# Patient Record
Sex: FEMALE | Race: WHITE | NOT HISPANIC OR LATINO | Employment: FULL TIME | URBAN - NONMETROPOLITAN AREA
[De-identification: names, ages, dates, MRNs, and addresses within clinical notes are randomized per-mention and may not be internally consistent; named-entity substitution may affect disease eponyms.]

---

## 2024-05-14 ENCOUNTER — HOSPITAL ENCOUNTER (EMERGENCY)
Facility: HOSPITAL | Age: 44
Discharge: HOME | End: 2024-05-14
Attending: EMERGENCY MEDICINE
Payer: COMMERCIAL

## 2024-05-14 ENCOUNTER — APPOINTMENT (EMERGENCY)
Dept: RADIOLOGY | Facility: HOSPITAL | Age: 44
End: 2024-05-14
Payer: COMMERCIAL

## 2024-05-14 VITALS
DIASTOLIC BLOOD PRESSURE: 77 MMHG | HEART RATE: 76 BPM | SYSTOLIC BLOOD PRESSURE: 126 MMHG | OXYGEN SATURATION: 98 % | RESPIRATION RATE: 18 BRPM | TEMPERATURE: 97.2 F

## 2024-05-14 DIAGNOSIS — S52.502A DISTAL RADIUS FRACTURE, LEFT: Primary | ICD-10-CM

## 2024-05-14 PROCEDURE — 99284 EMERGENCY DEPT VISIT MOD MDM: CPT | Performed by: EMERGENCY MEDICINE

## 2024-05-14 PROCEDURE — 99283 EMERGENCY DEPT VISIT LOW MDM: CPT

## 2024-05-14 PROCEDURE — 29125 APPL SHORT ARM SPLINT STATIC: CPT | Performed by: EMERGENCY MEDICINE

## 2024-05-14 PROCEDURE — 73110 X-RAY EXAM OF WRIST: CPT

## 2024-05-14 NOTE — ED PROVIDER NOTES
"History  Chief Complaint   Patient presents with    Wrist Injury     Pt was picking up dog food and felt a \"pop\" in left wrist.      Patient is a 44-year-old female presenting to the emergency department complaining of pain in her left wrist, states she was picking up a 48 pound bag of dog food when she felt a pop in her wrist and has been having pain since, denies any fall, no other pain or complaints        Prior to Admission Medications   Prescriptions Last Dose Informant Patient Reported? Taking?   albuterol (Proventil HFA) 90 mcg/act inhaler   No No   Sig: Inhale 2 puffs every 6 (six) hours as needed for wheezing      Facility-Administered Medications: None       Past Medical History:   Diagnosis Date    Asthma     Endometriosis     Mitral regurgitation        Past Surgical History:   Procedure Laterality Date    BREAST SURGERY      CHOLECYSTECTOMY      ENDOMETRIAL ABLATION      FOOT SURGERY Right     HYSTERECTOMY      SALIVARY GLAND SURGERY         History reviewed. No pertinent family history.  I have reviewed and agree with the history as documented.    E-Cigarette/Vaping    E-Cigarette Use Never User      E-Cigarette/Vaping Substances     Social History     Tobacco Use    Smoking status: Former     Types: Cigarettes    Smokeless tobacco: Never   Vaping Use    Vaping status: Never Used   Substance Use Topics    Alcohol use: Never    Drug use: Never       Review of Systems   Constitutional: Negative.    HENT: Negative.     Eyes: Negative.    Respiratory: Negative.     Cardiovascular: Negative.    Gastrointestinal: Negative.    Endocrine: Negative.    Genitourinary: Negative.    Musculoskeletal:  Positive for arthralgias.   Skin: Negative.    Allergic/Immunologic: Negative.    Neurological: Negative.    Hematological: Negative.    Psychiatric/Behavioral: Negative.         Physical Exam  Physical Exam  Constitutional:       Appearance: She is well-developed.   HENT:      Head: Normocephalic and atraumatic. "   Eyes:      Conjunctiva/sclera: Conjunctivae normal.      Pupils: Pupils are equal, round, and reactive to light.   Cardiovascular:      Rate and Rhythm: Normal rate.   Pulmonary:      Effort: Pulmonary effort is normal.   Abdominal:      Palpations: Abdomen is soft.   Musculoskeletal:         General: Normal range of motion.        Arms:       Cervical back: Normal range of motion and neck supple.      Comments: Tenderness to palpate on the dorsal surface of the left wrist, near the distal radius, minimal swelling, no ecchymosis, no bony deformity   Skin:     General: Skin is warm and dry.   Neurological:      Mental Status: She is alert and oriented to person, place, and time.         Vital Signs  ED Triage Vitals [05/14/24 1743]   Temperature Pulse Respirations Blood Pressure SpO2   (!) 97.2 °F (36.2 °C) 76 18 126/77 98 %      Temp Source Heart Rate Source Patient Position - Orthostatic VS BP Location FiO2 (%)   Temporal Monitor Sitting Right arm --      Pain Score       --           Vitals:    05/14/24 1743   BP: 126/77   Pulse: 76   Patient Position - Orthostatic VS: Sitting         Visual Acuity      ED Medications  Medications - No data to display    Diagnostic Studies  Results Reviewed       None                   XR wrist 3+ views LEFT   ED Interpretation by Ita Boateng DO (05/14 1825)   Questionable nondisplaced distal radius fracture                 Procedures  Splint application    Date/Time: 5/14/2024 6:26 PM    Performed by: Ita Boateng DO  Authorized by: Ita Boateng DO  Universal Protocol:  Consent: Verbal consent obtained.  Risks and benefits: risks, benefits and alternatives were discussed  Consent given by: patient  Patient understanding: patient states understanding of the procedure being performed  Required items: required blood products, implants, devices, and special equipment available  Patient identity confirmed: verbally with patient    Pre-procedure details:     Sensation:   Normal    Skin color:  Pink  Procedure details:     Laterality:  Left    Location:  Wrist    Wrist:  L wrist    Splint type:  Volar short arm    Supplies:  Cotton padding, elastic bandage and Ortho-Glass  Post-procedure details:     Pain:  Improved    Sensation:  Normal    Skin color:  Pink    Patient tolerance of procedure:  Tolerated well, no immediate complications           ED Course                               SBIRT 20yo+      Flowsheet Row Most Recent Value   Initial Alcohol Screen: US AUDIT-C     1. How often do you have a drink containing alcohol? 0 Filed at: 05/14/2024 1744   2. How many drinks containing alcohol do you have on a typical day you are drinking?  0 Filed at: 05/14/2024 1744   3b. FEMALE Any Age, or MALE 65+: How often do you have 4 or more drinks on one occassion? 0 Filed at: 05/14/2024 1744   Audit-C Score 0 Filed at: 05/14/2024 1744   IZA: How many times in the past year have you...    Used an illegal drug or used a prescription medication for non-medical reasons? Never Filed at: 05/14/2024 1744                      Medical Decision Making   Patient presents with left wrist pain.  Given history, exam and work-up, patient likely has possible nondisplaced distal radius fracture versus wrist sprain.  I have low suspicion for dislocation, significant ligamentous injury, septic arthritis, gout flare, new autoimmune arthropathy or gonococcal arthropathy.      Problems Addressed:  Distal radius fracture, left: acute illness or injury    Amount and/or Complexity of Data Reviewed  Radiology: ordered and independent interpretation performed. Decision-making details documented in ED Course.    Risk  OTC drugs.             Disposition  Final diagnoses:   Distal radius fracture, left     Time reflects when diagnosis was documented in both MDM as applicable and the Disposition within this note       Time User Action Codes Description Comment    5/14/2024  6:21 PM Ita Boateng Add [S52.502A] Distal  radius fracture, left           ED Disposition       ED Disposition   Discharge    Condition   Stable    Date/Time   Tue May 14, 2024 1821    Comment   Nory Quintero discharge to home/self care.                   Follow-up Information       Follow up With Specialties Details Why Contact Info    [facility] In 2 days  [address] [facility]  [address] [place] [id number]  [phone]              Discharge Medication List as of 5/14/2024  6:22 PM        CONTINUE these medications which have NOT CHANGED    Details   albuterol (Proventil HFA) 90 mcg/act inhaler Inhale 2 puffs every 6 (six) hours as needed for wheezing, Starting Sat 11/18/2023, Normal                 PDMP Review       None            ED Provider  Electronically Signed by             Ita Boateng DO  05/14/24 0793

## 2025-06-29 ENCOUNTER — APPOINTMENT (OUTPATIENT)
Dept: RADIOLOGY | Facility: CLINIC | Age: 45
End: 2025-06-29
Payer: COMMERCIAL

## 2025-06-29 ENCOUNTER — OFFICE VISIT (OUTPATIENT)
Dept: URGENT CARE | Facility: CLINIC | Age: 45
End: 2025-06-29
Payer: COMMERCIAL

## 2025-06-29 VITALS
DIASTOLIC BLOOD PRESSURE: 70 MMHG | HEART RATE: 77 BPM | BODY MASS INDEX: 28.61 KG/M2 | RESPIRATION RATE: 16 BRPM | WEIGHT: 178 LBS | OXYGEN SATURATION: 99 % | TEMPERATURE: 97.3 F | HEIGHT: 66 IN | SYSTOLIC BLOOD PRESSURE: 114 MMHG

## 2025-06-29 DIAGNOSIS — S39.92XA INJURY OF COCCYX, INITIAL ENCOUNTER: Primary | ICD-10-CM

## 2025-06-29 DIAGNOSIS — S39.92XA INJURY OF COCCYX, INITIAL ENCOUNTER: ICD-10-CM

## 2025-06-29 PROCEDURE — G0383 LEV 4 HOSP TYPE B ED VISIT: HCPCS

## 2025-06-29 PROCEDURE — 72220 X-RAY EXAM SACRUM TAILBONE: CPT

## 2025-06-29 PROCEDURE — S9083 URGENT CARE CENTER GLOBAL: HCPCS

## 2025-06-29 RX ORDER — LEVOTHYROXINE SODIUM 50 UG/1
50 TABLET ORAL DAILY
COMMUNITY

## 2025-06-29 RX ORDER — OMEGA-3S/DHA/EPA/FISH OIL/D3 300MG-1000
400 CAPSULE ORAL DAILY
COMMUNITY

## 2025-06-29 NOTE — PATIENT INSTRUCTIONS
Preliminary XR read negative, final read pending  OTC Tylenol/Ibuprofen for pain  Heat/Ice  Topical analgesics  Donut pillow  Referral placed to PT and Orthopedic Surgery, if needed   Follow up with PCP in 3-5 days.  Proceed to  ER if symptoms worsen.    If tests have been performed at [facility], our office will contact you with results if changes need to be made to the care plan discussed with you at the visit.  You can review your full results on [facility] MyChart.

## 2025-06-29 NOTE — PROGRESS NOTES
[facility]        NAME: Nory Quintero is a 45 y.o. female  : 1980    MRN: [id number]  DATE: 2025  TIME: 10:45 AM    Assessment and Plan   Injury of coccyx, initial encounter [S39.92XA]  1. Injury of coccyx, initial encounter  XR sacrum and coccyx    Ambulatory Referral to [facility]    Ambulatory referral to Spine & Pain Management          Preliminary XR read negative for acute osseous abnormality, final read pending. Degenerative changes. Encouraged continued supportive measures.  Referral placed to PT and Spine/Pain management. Follow up with PCP in 3-5 days or proceed to emergency department for worsening symptoms.  Patient verbalized understanding of instructions given.       Patient Instructions     Preliminary XR read negative, final read pending  OTC Tylenol/Ibuprofen for pain  Heat/Ice  Topical analgesics  Donut pillow  Referral placed to PT and Orthopedic Surgery, if needed   Follow up with PCP in 3-5 days.  Proceed to  ER if symptoms worsen.    If tests have been performed at [facility], our office will contact you with results if changes need to be made to the care plan discussed with you at the visit.  You can review your full results on [facility] MyChart.    Chief Complaint     Chief Complaint   Patient presents with    sacrum/coccyx pain     Fell down stairs 2 weeks ago - having pain in sacrum/coccyx area since then.  Trying to do light exercising to help relieve pain          History of Present Illness       45-year-old female with no significant past medical history presents with complaints of sacral pain.  Patient reports approximately 2 weeks ago mechanical fall down the stairs when she landed directly on buttocks and then slid down remaining steps.  She denies any bruising or swelling at that time but did not closely examine the area.  States pain is gradually increased and she has been trying to sit at work and use a stationary exercise bike and unable  "to do so.  Pain also increases with bending or twisting.  She has not been taking any OTC medications or applying heat or ice.  Denies numbness, tingling, or weakness.  No loss of bowel or bladder incontinence.    States history of scoliosis.         Review of Systems   Review of Systems   Constitutional:  Negative for chills and fever.   Respiratory:  Negative for cough, shortness of breath and wheezing.    Cardiovascular:  Negative for chest pain.   Gastrointestinal:  Negative for abdominal pain, diarrhea, nausea and vomiting.   Genitourinary:  Negative for decreased urine volume and difficulty urinating.   Musculoskeletal:  Positive for back pain.   Skin:  Negative for rash.   Neurological:  Negative for weakness and numbness.         Current Medications     Current Medications[1]    Current Allergies     Allergies as of 06/29/2025 - Reviewed 06/29/2025   Allergen Reaction Noted    Propoxyphene Anaphylaxis 02/17/2016            The following portions of the patient's history were reviewed and updated as appropriate: allergies, current medications, past family history, past medical history, past social history, past surgical history and problem list.     Past Medical History[2]    Past Surgical History[3]    Family History[4]      Medications have been verified.        Objective   /70   Pulse 77   Temp (!) 97.3 °F (36.3 °C)   Resp 16   Ht 5' 6\" (1.676 m)   Wt 80.7 kg (178 lb)   SpO2 99%   BMI 28.73 kg/m²   No LMP recorded. Patient has had a hysterectomy.       Physical Exam     Physical Exam  Vitals and nursing note reviewed.   Constitutional:       General: She is not in acute distress.     Appearance: She is not toxic-appearing.   HENT:      Head: Normocephalic.     Eyes:      Conjunctiva/sclera: Conjunctivae normal.     Pulmonary:      Effort: Pulmonary effort is normal.     Musculoskeletal:         General: Tenderness present. No swelling.      Thoracic back: No tenderness or bony tenderness.      " Additional Notes: Patient consent was obtained to proceed with the visit and recommended plan of care after discussion of all risks and benefits, including the risks of COVID-19 exposure. Lumbar back: Tenderness and bony tenderness present. No swelling or signs of trauma. Decreased range of motion.        Back:      Skin:     General: Skin is warm and dry.     Neurological:      Mental Status: She is alert and oriented to person, place, and time.      Sensory: Sensation is intact.      Motor: Motor function is intact.      Gait: Gait is intact.     Psychiatric:         Mood and Affect: Mood normal.         Behavior: Behavior normal.                        [1]   Current Outpatient Medications:     cholecalciferol (VITAMIN D3) 400 units tablet, Take 400 Units by mouth daily, Disp: , Rfl:     levothyroxine 50 mcg tablet, Take 50 mcg by mouth daily, Disp: , Rfl:     albuterol (Proventil HFA) 90 mcg/act inhaler, Inhale 2 puffs every 6 (six) hours as needed for wheezing (Patient not taking: Reported on 6/29/2025), Disp: 6.7 g, Rfl: 0  [2]   Past Medical History:  Diagnosis Date    Asthma     Disease of thyroid gland     Endometriosis     Mitral regurgitation    [3]   Past Surgical History:  Procedure Laterality Date    BREAST SURGERY      CHOLECYSTECTOMY      ENDOMETRIAL ABLATION      FOOT SURGERY Right     HYSTERECTOMY      SALIVARY GLAND SURGERY     [4] No family history on file.     Detail Level: Simple